# Patient Record
Sex: FEMALE | Race: WHITE | NOT HISPANIC OR LATINO
[De-identification: names, ages, dates, MRNs, and addresses within clinical notes are randomized per-mention and may not be internally consistent; named-entity substitution may affect disease eponyms.]

---

## 2017-02-02 ENCOUNTER — APPOINTMENT (OUTPATIENT)
Dept: ORTHOPEDIC SURGERY | Facility: CLINIC | Age: 62
End: 2017-02-02

## 2017-02-02 ENCOUNTER — OTHER (OUTPATIENT)
Age: 62
End: 2017-02-02

## 2017-02-02 DIAGNOSIS — Z85.9 PERSONAL HISTORY OF MALIGNANT NEOPLASM, UNSPECIFIED: ICD-10-CM

## 2017-02-02 DIAGNOSIS — Z82.62 FAMILY HISTORY OF OSTEOPOROSIS: ICD-10-CM

## 2017-02-02 DIAGNOSIS — Z78.9 OTHER SPECIFIED HEALTH STATUS: ICD-10-CM

## 2017-03-02 ENCOUNTER — APPOINTMENT (OUTPATIENT)
Dept: ORTHOPEDIC SURGERY | Facility: CLINIC | Age: 62
End: 2017-03-02

## 2017-03-02 DIAGNOSIS — S92.412A DISPLACED FRACTURE OF PROXIMAL PHALANX OF LEFT GREAT TOE, INITIAL ENCOUNTER FOR CLOSED FRACTURE: ICD-10-CM

## 2017-04-06 ENCOUNTER — APPOINTMENT (OUTPATIENT)
Dept: ORTHOPEDIC SURGERY | Facility: CLINIC | Age: 62
End: 2017-04-06

## 2017-05-12 ENCOUNTER — RX RENEWAL (OUTPATIENT)
Age: 62
End: 2017-05-12

## 2017-06-06 ENCOUNTER — RX RENEWAL (OUTPATIENT)
Age: 62
End: 2017-06-06

## 2017-07-10 ENCOUNTER — APPOINTMENT (OUTPATIENT)
Dept: INTERNAL MEDICINE | Facility: CLINIC | Age: 62
End: 2017-07-10

## 2017-07-10 VITALS
HEIGHT: 62 IN | SYSTOLIC BLOOD PRESSURE: 172 MMHG | DIASTOLIC BLOOD PRESSURE: 82 MMHG | BODY MASS INDEX: 26.87 KG/M2 | WEIGHT: 146 LBS | TEMPERATURE: 98.1 F | HEART RATE: 108 BPM | OXYGEN SATURATION: 98 %

## 2017-07-10 DIAGNOSIS — L98.9 DISORDER OF THE SKIN AND SUBCUTANEOUS TISSUE, UNSPECIFIED: ICD-10-CM

## 2017-07-10 DIAGNOSIS — Z82.49 FAMILY HISTORY OF ISCHEMIC HEART DISEASE AND OTHER DISEASES OF THE CIRCULATORY SYSTEM: ICD-10-CM

## 2017-07-10 DIAGNOSIS — Z83.3 FAMILY HISTORY OF DIABETES MELLITUS: ICD-10-CM

## 2017-07-10 DIAGNOSIS — Z80.1 FAMILY HISTORY OF MALIGNANT NEOPLASM OF TRACHEA, BRONCHUS AND LUNG: ICD-10-CM

## 2017-07-10 RX ORDER — AMOXICILLIN 875 MG/1
875 TABLET, FILM COATED ORAL
Qty: 19 | Refills: 0 | Status: DISCONTINUED | COMMUNITY
Start: 2017-01-27 | End: 2017-07-10

## 2017-07-10 RX ORDER — AMOXICILLIN 500 MG/1
500 CAPSULE ORAL
Qty: 22 | Refills: 0 | Status: DISCONTINUED | COMMUNITY
Start: 2016-12-21 | End: 2017-07-10

## 2017-07-10 RX ORDER — PREDNISONE 20 MG/1
20 TABLET ORAL
Qty: 10 | Refills: 0 | Status: DISCONTINUED | COMMUNITY
Start: 2016-12-10 | End: 2017-07-10

## 2017-07-10 RX ORDER — BIMATOPROST 0.1 MG/ML
0.01 SOLUTION/ DROPS OPHTHALMIC
Qty: 5 | Refills: 0 | Status: DISCONTINUED | COMMUNITY
Start: 2016-08-12 | End: 2017-07-10

## 2017-07-10 RX ORDER — BETAMETHASONE VALERATE 1 MG/G
0.1 CREAM TOPICAL
Qty: 45 | Refills: 0 | Status: DISCONTINUED | COMMUNITY
Start: 2016-12-20 | End: 2017-07-10

## 2017-07-10 RX ORDER — HYDROCODONE BITARTRATE AND ACETAMINOPHEN 5; 300 MG/1; MG/1
5-300 TABLET ORAL
Qty: 60 | Refills: 0 | Status: DISCONTINUED | COMMUNITY
Start: 2016-12-14 | End: 2017-07-10

## 2017-07-24 ENCOUNTER — APPOINTMENT (OUTPATIENT)
Dept: DERMATOLOGY | Facility: CLINIC | Age: 62
End: 2017-07-24

## 2017-08-17 ENCOUNTER — OUTPATIENT (OUTPATIENT)
Dept: OUTPATIENT SERVICES | Facility: HOSPITAL | Age: 62
LOS: 1 days | Discharge: HOME | End: 2017-08-17

## 2017-08-17 DIAGNOSIS — Z85.3 PERSONAL HISTORY OF MALIGNANT NEOPLASM OF BREAST: ICD-10-CM

## 2017-08-17 DIAGNOSIS — Z90.10 ACQUIRED ABSENCE OF UNSPECIFIED BREAST AND NIPPLE: Chronic | ICD-10-CM

## 2017-08-21 ENCOUNTER — APPOINTMENT (OUTPATIENT)
Dept: HEMATOLOGY ONCOLOGY | Facility: CLINIC | Age: 62
End: 2017-08-21

## 2017-09-25 ENCOUNTER — APPOINTMENT (OUTPATIENT)
Dept: OBGYN | Facility: CLINIC | Age: 62
End: 2017-09-25
Payer: MEDICAID

## 2017-09-25 VITALS
HEIGHT: 62 IN | BODY MASS INDEX: 27.23 KG/M2 | WEIGHT: 148 LBS | DIASTOLIC BLOOD PRESSURE: 80 MMHG | SYSTOLIC BLOOD PRESSURE: 140 MMHG

## 2017-09-25 DIAGNOSIS — Z92.21 PERSONAL HISTORY OF ANTINEOPLASTIC CHEMOTHERAPY: ICD-10-CM

## 2017-09-25 PROCEDURE — 82270 OCCULT BLOOD FECES: CPT

## 2017-09-25 PROCEDURE — 99386 PREV VISIT NEW AGE 40-64: CPT

## 2017-09-27 LAB — HPV HIGH+LOW RISK DNA PNL CVX: NEGATIVE

## 2017-09-28 LAB — CYTOLOGY CVX/VAG DOC THIN PREP: NORMAL

## 2017-10-13 ENCOUNTER — RX RENEWAL (OUTPATIENT)
Age: 62
End: 2017-10-13

## 2017-11-30 ENCOUNTER — MEDICATION RENEWAL (OUTPATIENT)
Age: 62
End: 2017-11-30

## 2017-12-03 ENCOUNTER — RX RENEWAL (OUTPATIENT)
Age: 62
End: 2017-12-03

## 2018-02-28 ENCOUNTER — RX RENEWAL (OUTPATIENT)
Age: 63
End: 2018-02-28

## 2018-05-29 ENCOUNTER — RX RENEWAL (OUTPATIENT)
Age: 63
End: 2018-05-29

## 2018-06-05 ENCOUNTER — RX RENEWAL (OUTPATIENT)
Age: 63
End: 2018-06-05

## 2018-07-23 ENCOUNTER — RX RENEWAL (OUTPATIENT)
Age: 63
End: 2018-07-23

## 2018-08-06 ENCOUNTER — RX RENEWAL (OUTPATIENT)
Age: 63
End: 2018-08-06

## 2018-09-04 ENCOUNTER — RX RENEWAL (OUTPATIENT)
Age: 63
End: 2018-09-04

## 2018-09-17 ENCOUNTER — APPOINTMENT (OUTPATIENT)
Dept: OBGYN | Facility: CLINIC | Age: 63
End: 2018-09-17

## 2018-09-19 ENCOUNTER — APPOINTMENT (OUTPATIENT)
Dept: INTERNAL MEDICINE | Facility: CLINIC | Age: 63
End: 2018-09-19
Payer: MEDICAID

## 2018-09-19 VITALS — HEIGHT: 62 IN | WEIGHT: 151 LBS | BODY MASS INDEX: 27.79 KG/M2 | HEART RATE: 99 BPM | OXYGEN SATURATION: 97 %

## 2018-09-19 VITALS — SYSTOLIC BLOOD PRESSURE: 148 MMHG | DIASTOLIC BLOOD PRESSURE: 90 MMHG

## 2018-09-19 VITALS — SYSTOLIC BLOOD PRESSURE: 160 MMHG | DIASTOLIC BLOOD PRESSURE: 98 MMHG

## 2018-09-19 DIAGNOSIS — S92.412D DISPLACED FRACTURE OF PROXIMAL PHALANX OF LEFT GREAT TOE, SUBSEQUENT ENCOUNTER FOR FRACTURE WITH ROUTINE HEALING: ICD-10-CM

## 2018-09-19 DIAGNOSIS — Z85.3 PERSONAL HISTORY OF MALIGNANT NEOPLASM OF BREAST: ICD-10-CM

## 2018-09-19 DIAGNOSIS — Z01.419 ENCOUNTER FOR GYNECOLOGICAL EXAMINATION (GENERAL) (ROUTINE) W/OUT ABNORMAL FINDINGS: ICD-10-CM

## 2018-09-19 PROCEDURE — 99214 OFFICE O/P EST MOD 30 MIN: CPT | Mod: 25

## 2018-09-19 PROCEDURE — 36415 COLL VENOUS BLD VENIPUNCTURE: CPT

## 2018-09-19 NOTE — HISTORY OF PRESENT ILLNESS
[de-identified] : Patient presents for follow up of hypertension on metoprolol, breast cancer diagnosed in 2009 s/p L mastectomy (and prophylactic left mastectomy) now on letrozole and osteoporosis not on medication (previously on Prolia). She checks her BP at home with readings 140/80s. She complains of anxiety, worse when not working. Has had some changes at home as he  recently started a new job and her health insurance will be changing.

## 2018-09-19 NOTE — PHYSICAL EXAM
[No Acute Distress] : no acute distress [No Respiratory Distress] : no respiratory distress  [Clear to Auscultation] : lungs were clear to auscultation bilaterally [No Accessory Muscle Use] : no accessory muscle use [Normal Rate] : normal rate  [Regular Rhythm] : with a regular rhythm [Normal S1, S2] : normal S1 and S2 [No Murmur] : no murmur heard [No Edema] : there was no peripheral edema [Normal Affect] : the affect was normal [Alert and Oriented x3] : oriented to person, place, and time [Normal Insight/Judgement] : insight and judgment were intact

## 2018-09-19 NOTE — ASSESSMENT
[FreeTextEntry1] : Blood pressure elevated. Does not want to add another medication. Will consider changing meds. Will change to benazepril 40mg for improved BP control, will first taper off metorpolol. She will continue to check BP at home with goal readings <130/80.  \par On calcium/vitamin D for osteoporosis.\par xanax rpn anxiety.\par She follows with oncology, regularly for history of breast cancer. \par She is current with gyn. \par Declined colon cancer screening.  \par She will follow up in 6-8 weeks.

## 2018-10-15 ENCOUNTER — RX RENEWAL (OUTPATIENT)
Age: 63
End: 2018-10-15

## 2019-01-09 ENCOUNTER — RX RENEWAL (OUTPATIENT)
Age: 64
End: 2019-01-09

## 2019-01-19 ENCOUNTER — RX RENEWAL (OUTPATIENT)
Age: 64
End: 2019-01-19

## 2019-01-30 ENCOUNTER — MEDICATION RENEWAL (OUTPATIENT)
Age: 64
End: 2019-01-30

## 2019-02-04 ENCOUNTER — MEDICATION RENEWAL (OUTPATIENT)
Age: 64
End: 2019-02-04

## 2019-02-08 ENCOUNTER — RX RENEWAL (OUTPATIENT)
Age: 64
End: 2019-02-08

## 2019-04-19 ENCOUNTER — RX RENEWAL (OUTPATIENT)
Age: 64
End: 2019-04-19

## 2019-06-25 ENCOUNTER — RX RENEWAL (OUTPATIENT)
Age: 64
End: 2019-06-25

## 2019-11-12 ENCOUNTER — MEDICATION RENEWAL (OUTPATIENT)
Age: 64
End: 2019-11-12

## 2019-11-13 ENCOUNTER — MEDICATION RENEWAL (OUTPATIENT)
Age: 64
End: 2019-11-13

## 2019-12-02 ENCOUNTER — RX RENEWAL (OUTPATIENT)
Age: 64
End: 2019-12-02

## 2019-12-03 ENCOUNTER — RX RENEWAL (OUTPATIENT)
Age: 64
End: 2019-12-03

## 2020-01-16 ENCOUNTER — APPOINTMENT (OUTPATIENT)
Dept: INTERNAL MEDICINE | Facility: CLINIC | Age: 65
End: 2020-01-16
Payer: MEDICARE

## 2020-01-16 ENCOUNTER — NON-APPOINTMENT (OUTPATIENT)
Age: 65
End: 2020-01-16

## 2020-01-16 VITALS
WEIGHT: 144 LBS | BODY MASS INDEX: 26.5 KG/M2 | SYSTOLIC BLOOD PRESSURE: 150 MMHG | OXYGEN SATURATION: 97 % | HEART RATE: 95 BPM | DIASTOLIC BLOOD PRESSURE: 80 MMHG | HEIGHT: 62 IN

## 2020-01-16 PROCEDURE — G0442 ANNUAL ALCOHOL SCREEN 15 MIN: CPT | Mod: 59

## 2020-01-16 PROCEDURE — G0402 INITIAL PREVENTIVE EXAM: CPT

## 2020-01-16 PROCEDURE — 36415 COLL VENOUS BLD VENIPUNCTURE: CPT

## 2020-01-16 PROCEDURE — 93000 ELECTROCARDIOGRAM COMPLETE: CPT | Mod: 59

## 2020-01-16 RX ORDER — DENOSUMAB 60 MG/ML
INJECTION SUBCUTANEOUS
Refills: 0 | Status: DISCONTINUED | COMMUNITY
End: 2020-01-16

## 2020-01-16 RX ORDER — METOPROLOL SUCCINATE 50 MG/1
50 TABLET, EXTENDED RELEASE ORAL DAILY
Qty: 30 | Refills: 5 | Status: DISCONTINUED | COMMUNITY
Start: 2016-11-16 | End: 2020-01-16

## 2020-01-16 RX ORDER — IBUPROFEN 200 MG
200 TABLET ORAL
Qty: 180 | Refills: 0 | Status: DISCONTINUED | COMMUNITY
Start: 2018-04-20 | End: 2020-01-16

## 2020-01-16 RX ORDER — LETROZOLE TABLETS 2.5 MG/1
2.5 TABLET, FILM COATED ORAL DAILY
Qty: 90 | Refills: 2 | Status: DISCONTINUED | COMMUNITY
Start: 2017-05-12 | End: 2020-01-16

## 2020-01-17 ENCOUNTER — RESULT REVIEW (OUTPATIENT)
Age: 65
End: 2020-01-17

## 2020-01-17 LAB
ALBUMIN SERPL ELPH-MCNC: 4.3 G/DL
ALP BLD-CCNC: 75 U/L
ALT SERPL-CCNC: 7 U/L
ANION GAP SERPL CALC-SCNC: 15 MMOL/L
AST SERPL-CCNC: 17 U/L
BASOPHILS # BLD AUTO: 0.08 K/UL
BASOPHILS NFR BLD AUTO: 1 %
BILIRUB SERPL-MCNC: 0.8 MG/DL
BUN SERPL-MCNC: 19 MG/DL
CALCIUM SERPL-MCNC: 10.2 MG/DL
CHLORIDE SERPL-SCNC: 105 MMOL/L
CHOLEST SERPL-MCNC: 195 MG/DL
CHOLEST/HDLC SERPL: 4.1 RATIO
CO2 SERPL-SCNC: 23 MMOL/L
CREAT SERPL-MCNC: 0.66 MG/DL
CREAT SPEC-SCNC: 143 MG/DL
EOSINOPHIL # BLD AUTO: 0.13 K/UL
EOSINOPHIL NFR BLD AUTO: 1.6 %
ESTIMATED AVERAGE GLUCOSE: 120 MG/DL
GLUCOSE SERPL-MCNC: 102 MG/DL
HBA1C MFR BLD HPLC: 5.8 %
HCT VFR BLD CALC: 43.3 %
HDLC SERPL-MCNC: 48 MG/DL
HGB BLD-MCNC: 13.7 G/DL
IMM GRANULOCYTES NFR BLD AUTO: 0.1 %
LDLC SERPL CALC-MCNC: 122 MG/DL
LYMPHOCYTES # BLD AUTO: 1.71 K/UL
LYMPHOCYTES NFR BLD AUTO: 20.8 %
MAN DIFF?: NORMAL
MCHC RBC-ENTMCNC: 27.1 PG
MCHC RBC-ENTMCNC: 31.6 GM/DL
MCV RBC AUTO: 85.6 FL
MICROALBUMIN 24H UR DL<=1MG/L-MCNC: 3.2 MG/DL
MICROALBUMIN/CREAT 24H UR-RTO: 23 MG/G
MONOCYTES # BLD AUTO: 0.59 K/UL
MONOCYTES NFR BLD AUTO: 7.2 %
NEUTROPHILS # BLD AUTO: 5.71 K/UL
NEUTROPHILS NFR BLD AUTO: 69.3 %
PLATELET # BLD AUTO: 402 K/UL
POTASSIUM SERPL-SCNC: 4.4 MMOL/L
PROT SERPL-MCNC: 7.3 G/DL
RBC # BLD: 5.06 M/UL
RBC # FLD: 13.8 %
SODIUM SERPL-SCNC: 142 MMOL/L
TRIGL SERPL-MCNC: 130 MG/DL
TSH SERPL-ACNC: 0.71 UIU/ML
WBC # FLD AUTO: 8.23 K/UL

## 2020-01-17 NOTE — ASSESSMENT
[FreeTextEntry1] : -Patient declines repeat DEXA\par  -Patient declines f/u w/ GI at this time for colonoscopy (has never had a colonoscopy)\par  -F/u w/ oncology regarding Hx/o breast CA\par  -F/u w/ GYN for Pap smear\par  -BP well-controlled. Continue current medication\par -LAD noted on EKG which is new. Cardiology consultation/evaluation (referral given today)\par  -Start Lexapro 5 mg for management of anxiety. She is to call or RTO in 3-4 weeks to discuss response to treatment.\par  -RTO 2 months (readdress anxiety, PCV13, Shingrix, Colonoscopy)

## 2020-01-17 NOTE — HEALTH RISK ASSESSMENT
[Very Good] : ~his/her~  mood as very good [No] : In the past 12 months have you used drugs other than those required for medical reasons? No [No falls in past year] : Patient reported no falls in the past year [0] : 2) Feeling down, depressed, or hopeless: Not at all (0) [Patient declined bone density test] : Patient declined bone density test [Patient reported PAP Smear was normal] : Patient reported PAP Smear was normal [Patient declined colonoscopy] : Patient declined colonoscopy [None] : None [With Family] : lives with family [Employed] : employed [] :  [# Of Children ___] : has [unfilled] children [Sexually Active] : sexually active [Reviewed no changes] : Reviewed no changes [Fully functional (bathing, dressing, toileting, transferring, walking, feeding)] : Fully functional (bathing, dressing, toileting, transferring, walking, feeding) [Fully functional (using the telephone, shopping, preparing meals, housekeeping, doing laundry, using] : Fully functional and needs no help or supervision to perform IADLs (using the telephone, shopping, preparing meals, housekeeping, doing laundry, using transportation, managing medications and managing finances) [] : No [Audit-CScore] : 0 [[id number]] : 0 [Change in mental status noted] : No change in mental status noted [High Risk Behavior] : no high risk behavior [Reports changes in hearing] : Reports no changes in hearing [Reports changes in vision] : Reports no changes in vision [MammogramComments] : B/l Mastectomy [PapSmearDate] : 01/18 [BoneDensityDate] : 01/20 [ColonoscopyDate] : 01/20 [AdvancecareDate] : 01/20

## 2020-01-17 NOTE — HISTORY OF PRESENT ILLNESS
[FreeTextEntry1] : Annual well check  [de-identified] : -PMH: Breast CA (Dx 2009. S/p B/l  Mastectomy & Chemo), HTN, Osteoporosis\par -SH: . Day care center teacher. 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 \par Today, pt reports feeling well but reports a significant amount of anxiety that has been going on for several years since Dx of breast CA. Her anxiety is situational with regards to doctor visit. She enjoys working. Her husbands health and their financial concerns are also stress inducing. Was taking Xanax in the past. She inquirer about other medication at this time. \par \par -HTN: Currently on Benzapril 40mg QD. \par -Osteoporosis: Was on Prolia in the past.\par \par Follows with the following Physicians: \par -Pain Mgt: Dr. Caro\par -OBGYN: Dr. Collado\par \par -Vaccines: Needs PPSV 23, PCV 13, Shingles, TDap\par -DEXA: 2018\par -Pap Smear: 2018\par -Colonoscopy: Never had one. Declines at this time.

## 2020-01-17 NOTE — ADDENDUM
[FreeTextEntry1] : TSH WNL\par CBC/CMP WNL\par Lipid panel WNL\par Urine microalbumin WNL\par A1c 5.8\par \par Patient does have prediabetes and thus recommend dietary/lifestyle modifications with goal of weight loss and we'll repeat labs in 6 months

## 2020-01-30 ENCOUNTER — RX RENEWAL (OUTPATIENT)
Age: 65
End: 2020-01-30

## 2020-02-10 ENCOUNTER — RX CHANGE (OUTPATIENT)
Age: 65
End: 2020-02-10

## 2020-02-11 ENCOUNTER — RX CHANGE (OUTPATIENT)
Age: 65
End: 2020-02-11

## 2020-02-26 ENCOUNTER — RX CHANGE (OUTPATIENT)
Age: 65
End: 2020-02-26

## 2020-02-27 ENCOUNTER — RX CHANGE (OUTPATIENT)
Age: 65
End: 2020-02-27

## 2020-08-28 ENCOUNTER — RX RENEWAL (OUTPATIENT)
Age: 65
End: 2020-08-28

## 2020-11-18 ENCOUNTER — APPOINTMENT (OUTPATIENT)
Dept: INTERNAL MEDICINE | Facility: CLINIC | Age: 65
End: 2020-11-18
Payer: MEDICARE

## 2020-11-18 VITALS
TEMPERATURE: 97 F | HEIGHT: 62 IN | SYSTOLIC BLOOD PRESSURE: 166 MMHG | OXYGEN SATURATION: 98 % | BODY MASS INDEX: 26.31 KG/M2 | HEART RATE: 103 BPM | RESPIRATION RATE: 14 BRPM | WEIGHT: 143 LBS | DIASTOLIC BLOOD PRESSURE: 98 MMHG

## 2020-11-18 PROCEDURE — 36415 COLL VENOUS BLD VENIPUNCTURE: CPT

## 2020-11-18 PROCEDURE — 99214 OFFICE O/P EST MOD 30 MIN: CPT | Mod: 25

## 2020-11-18 RX ORDER — LATANOPROST/PF 0.005 %
DROPS OPHTHALMIC (EYE)
Refills: 0 | Status: ACTIVE | COMMUNITY

## 2020-11-18 RX ORDER — IBUPROFEN 200 MG/1
200 TABLET, COATED ORAL EVERY 8 HOURS
Qty: 180 | Refills: 2 | Status: DISCONTINUED | COMMUNITY
Start: 2017-07-10 | End: 2020-11-18

## 2020-11-18 RX ORDER — IBUPROFEN 200 MG/1
200 TABLET, FILM COATED ORAL
Qty: 180 | Refills: 2 | Status: DISCONTINUED | COMMUNITY
Start: 2017-10-13 | End: 2020-11-18

## 2020-11-18 RX ORDER — ALPRAZOLAM 0.25 MG/1
0.25 TABLET ORAL
Qty: 30 | Refills: 0 | Status: DISCONTINUED | COMMUNITY
Start: 2018-09-19 | End: 2020-11-18

## 2020-11-18 RX ORDER — LETROZOLE TABLETS 2.5 MG/1
2.5 TABLET, FILM COATED ORAL DAILY
Qty: 90 | Refills: 0 | Status: DISCONTINUED | COMMUNITY
Start: 2017-06-06 | End: 2020-11-18

## 2020-11-18 RX ORDER — TIMOLOL MALEATE 5 MG/ML
0.5 SOLUTION OPHTHALMIC
Refills: 0 | Status: ACTIVE | COMMUNITY
Start: 2016-10-12

## 2020-11-19 ENCOUNTER — NON-APPOINTMENT (OUTPATIENT)
Age: 65
End: 2020-11-19

## 2020-11-19 LAB
ANION GAP SERPL CALC-SCNC: 11 MMOL/L
BASOPHILS # BLD AUTO: 0.08 K/UL
BASOPHILS NFR BLD AUTO: 0.8 %
BUN SERPL-MCNC: 21 MG/DL
CALCIUM SERPL-MCNC: 10 MG/DL
CHLORIDE SERPL-SCNC: 104 MMOL/L
CHOLEST SERPL-MCNC: 210 MG/DL
CO2 SERPL-SCNC: 24 MMOL/L
CREAT SERPL-MCNC: 0.63 MG/DL
EOSINOPHIL # BLD AUTO: 0.33 K/UL
EOSINOPHIL NFR BLD AUTO: 3.4 %
ESTIMATED AVERAGE GLUCOSE: 114 MG/DL
GLUCOSE SERPL-MCNC: 101 MG/DL
HBA1C MFR BLD HPLC: 5.6 %
HCT VFR BLD CALC: 42.4 %
HDLC SERPL-MCNC: 49 MG/DL
HGB BLD-MCNC: 13.4 G/DL
IMM GRANULOCYTES NFR BLD AUTO: 0.2 %
LDLC SERPL CALC-MCNC: 132 MG/DL
LYMPHOCYTES # BLD AUTO: 4.05 K/UL
LYMPHOCYTES NFR BLD AUTO: 41.2 %
MAN DIFF?: NORMAL
MCHC RBC-ENTMCNC: 26.6 PG
MCHC RBC-ENTMCNC: 31.6 GM/DL
MCV RBC AUTO: 84.3 FL
MONOCYTES # BLD AUTO: 0.8 K/UL
MONOCYTES NFR BLD AUTO: 8.1 %
NEUTROPHILS # BLD AUTO: 4.56 K/UL
NEUTROPHILS NFR BLD AUTO: 46.3 %
NONHDLC SERPL-MCNC: 162 MG/DL
PLATELET # BLD AUTO: 383 K/UL
POTASSIUM SERPL-SCNC: 4.1 MMOL/L
RBC # BLD: 5.03 M/UL
RBC # FLD: 14.5 %
SODIUM SERPL-SCNC: 139 MMOL/L
TRIGL SERPL-MCNC: 148 MG/DL
WBC # FLD AUTO: 9.84 K/UL

## 2020-11-19 NOTE — HISTORY OF PRESENT ILLNESS
[FreeTextEntry1] : F/u HTN [de-identified] : -PMH: HTN, Breast CA (Dx 2009. 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 She mentions that she checks her BP at home and on Avg her SBP is 130-140. No reported changes. \par \par -ANDREAS: She was started on Lexapro back in January but states she never started medication and then failed to f/u. She does report persistence of anxiety. And constant worrying regarding her health and the health of her close family members. She is most concerned there is something wrong or that she will develop bad news. She denies feeling down or depressed.\par \par -Osteoporosis: Was on Prolia in the past but no longer. Now on Boniva as of September 2020 as per Ortho. Remains on Vit-D & Ca sup.

## 2020-11-19 NOTE — ASSESSMENT
[FreeTextEntry1] : -PMH: HTN, Breast CA (Dx 2009. 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 Caro\par -OBGYN: Dr. Collado\par \par -F/u w/ oncology regarding Hx/o breast CA\par -F/u w/ GYN (Pap smear)\par -Still needs to f/u w/ Cardio (New LAD noted on EKG)\par -RTO 1mo via Telehealth

## 2020-11-21 ENCOUNTER — TRANSCRIPTION ENCOUNTER (OUTPATIENT)
Age: 65
End: 2020-11-21

## 2020-12-16 ENCOUNTER — APPOINTMENT (OUTPATIENT)
Dept: INTERNAL MEDICINE | Facility: CLINIC | Age: 65
End: 2020-12-16
Payer: MEDICARE

## 2020-12-16 PROCEDURE — 99441: CPT

## 2020-12-16 RX ORDER — ESCITALOPRAM OXALATE 5 MG/1
5 TABLET ORAL DAILY
Qty: 30 | Refills: 0 | Status: DISCONTINUED | COMMUNITY
Start: 2020-01-16 | End: 2020-12-16

## 2020-12-16 NOTE — HISTORY OF PRESENT ILLNESS
[FreeTextEntry1] : F/u HTN [Home] : at home, [unfilled] , at the time of the visit. [Medical Office: ([facility] [place])___] : at the medical office located in  [Verbal consent obtained from patient] : the patient, [unfilled] [de-identified] : -PMH: HTN, Breast CA (Dx 2009. 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 Today, she reports that her anxiety remains unchanged as previously discussed at last followup visit. She inquires about alternative therapy\par \par \par -HTN: Remains on Benzapril 40mg QD. She mentions that she checks her BP at home and on Avg her SBP is 130-140. No reported changes. \par \par -ANDREAS: She was started on Lexapro back in January but states she never started medication and then failed to f/u. She does report persistence of anxiety. And constant worrying regarding her health and the health of her close family members. She is most concerned there is something wrong or that she will develop bad news. She denies feeling down or depressed.\par \par -Osteoporosis: Was on Prolia in the past but no longer. Now on Boniva as of September 2020 as per Ortho. Remains on Vit-D & Ca sup.

## 2020-12-16 NOTE — ASSESSMENT
[FreeTextEntry1] : -PMH: HTN, Breast CA (Dx 2009. S/p B/l Mastectomy & Chemo), Osteoporosis\par -SH: . Day care center teacher. 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 Caro\par -OBGYN: Dr. Collado\par \par -F/u w/ oncology regarding Hx/o breast CA\par -F/u w/ GYN (Pap smear)\par -Still needs to f/u w/ Cardio (New LAD noted on EKG)\par -RTO 2wk via Telehealth

## 2021-01-11 ENCOUNTER — APPOINTMENT (OUTPATIENT)
Dept: INTERNAL MEDICINE | Facility: CLINIC | Age: 66
End: 2021-01-11
Payer: MEDICARE

## 2021-01-11 PROCEDURE — 99441: CPT

## 2021-01-11 NOTE — HISTORY OF PRESENT ILLNESS
[Home] : at home, [unfilled] , at the time of the visit. [Medical Office: ([facility])___] : at the medical office located in  [Verbal consent obtained from patient] : the patient, [unfilled] [FreeTextEntry1] : Anxiety  [de-identified] : -PMH: HTN, Breast CA (Dx 2009. 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 She does not note any significant improvement of symptoms at this time but also no adverse effects. \par \par -ANDREAS: Unable to tolerate Lexapro. Now on Sertraline as of December 16th. Previously, pt reported constant worrying regarding her health and the health of her close family members. She is most concerned there is something wrong or that she will develop bad news. She continues to deny feeling down or depressed. \par \par -HTN: Remains on Benzapril 40mg QD. She mentions that she checks her BP at home and on Avg her SBP is 130-140. No reported changes\par \par -Osteoporosis: Was on Prolia in the past but no longer. Now on Boniva as of September 2020 as per Ortho. Remains on Vit-D & Ca sup.

## 2021-02-03 ENCOUNTER — RX RENEWAL (OUTPATIENT)
Age: 66
End: 2021-02-03

## 2021-03-22 ENCOUNTER — APPOINTMENT (OUTPATIENT)
Dept: INTERNAL MEDICINE | Facility: CLINIC | Age: 66
End: 2021-03-22
Payer: MEDICARE

## 2021-03-22 ENCOUNTER — TRANSCRIPTION ENCOUNTER (OUTPATIENT)
Age: 66
End: 2021-03-22

## 2021-03-22 VITALS
DIASTOLIC BLOOD PRESSURE: 88 MMHG | RESPIRATION RATE: 13 BRPM | SYSTOLIC BLOOD PRESSURE: 145 MMHG | HEART RATE: 89 BPM | TEMPERATURE: 98 F

## 2021-03-22 DIAGNOSIS — Z97.3 PRESENCE OF SPECTACLES AND CONTACT LENSES: ICD-10-CM

## 2021-03-22 DIAGNOSIS — H53.9 UNSPECIFIED VISUAL DISTURBANCE: ICD-10-CM

## 2021-03-22 DIAGNOSIS — H40.9 UNSPECIFIED GLAUCOMA: ICD-10-CM

## 2021-03-22 DIAGNOSIS — H26.9 UNSPECIFIED CATARACT: ICD-10-CM

## 2021-03-22 PROCEDURE — 99072 ADDL SUPL MATRL&STAF TM PHE: CPT

## 2021-03-22 PROCEDURE — 99213 OFFICE O/P EST LOW 20 MIN: CPT | Mod: 25

## 2021-03-22 PROCEDURE — 36415 COLL VENOUS BLD VENIPUNCTURE: CPT

## 2021-03-22 RX ORDER — SERTRALINE HYDROCHLORIDE 50 MG/1
50 TABLET, FILM COATED ORAL
Qty: 60 | Refills: 0 | Status: DISCONTINUED | COMMUNITY
Start: 2020-12-16 | End: 2021-03-22

## 2021-03-22 RX ORDER — LETROZOLE TABLETS 2.5 MG/1
2.5 TABLET, FILM COATED ORAL DAILY
Qty: 90 | Refills: 3 | Status: DISCONTINUED | COMMUNITY
Start: 2019-02-08 | End: 2021-03-22

## 2021-03-22 NOTE — HISTORY OF PRESENT ILLNESS
[FreeTextEntry8] : Dr. WILLARD pt\par -Pt presents c/o Vision issues that started a few weeks ago. Patient states she does follow with ophthalmology q.6 months and she does wear glasses. Patient was told she does have the start of cataracts. Patient does continue on lansoprazole/timolol for glaucoma and reports pressure as good. Patient has had no vision loss/eye discharge/eye pain but patient states" blurry at times". Patient has underlying anxiety and states she is worried about her eyes and is concerned she is diabetic, would like blood work checked. Last A1c was normal at 5.6

## 2021-03-22 NOTE — ASSESSMENT
[FreeTextEntry1] : Venipuncture done in our office, Labs sent out.Ophthalmology followup as scheduled. Patient will return here for routine check as scheduled w/Dr. WILLARD\par \par Dr. Luke was present in office building while I examined patient\par \par EYE MD-Dr. Morley\par

## 2021-03-22 NOTE — PHYSICAL EXAM
[No Acute Distress] : no acute distress [No Respiratory Distress] : no respiratory distress  [Clear to Auscultation] : lungs were clear to auscultation bilaterally [Normal Rate] : normal rate  [Regular Rhythm] : with a regular rhythm [Normal Affect] : the affect was normal [Normal Mood] : the mood was normal [Normal Sclera/Conjunctiva] : normal sclera/conjunctiva [PERRL] : pupils equal round and reactive to light [EOMI] : extraocular movements intact [Normal Outer Ear/Nose] : the outer ears and nose were normal in appearance [Normal Oropharynx] : the oropharynx was normal [Normal TMs] : both tympanic membranes were normal [Normal Nasal Mucosa] : the nasal mucosa was normal [Supple] : supple

## 2021-03-23 LAB
ALBUMIN SERPL ELPH-MCNC: 4.5 G/DL
ALP BLD-CCNC: 83 U/L
ALT SERPL-CCNC: 14 U/L
ANION GAP SERPL CALC-SCNC: 12 MMOL/L
AST SERPL-CCNC: 20 U/L
BASOPHILS # BLD AUTO: 0.11 K/UL
BASOPHILS NFR BLD AUTO: 1.1 %
BILIRUB SERPL-MCNC: 0.3 MG/DL
BUN SERPL-MCNC: 18 MG/DL
CALCIUM SERPL-MCNC: 10 MG/DL
CHLORIDE SERPL-SCNC: 101 MMOL/L
CO2 SERPL-SCNC: 25 MMOL/L
CREAT SERPL-MCNC: 0.73 MG/DL
EOSINOPHIL # BLD AUTO: 0.25 K/UL
EOSINOPHIL NFR BLD AUTO: 2.6 %
ESTIMATED AVERAGE GLUCOSE: 120 MG/DL
GLUCOSE SERPL-MCNC: 95 MG/DL
HBA1C MFR BLD HPLC: 5.8 %
HCT VFR BLD CALC: 41.4 %
HGB BLD-MCNC: 12.9 G/DL
IMM GRANULOCYTES NFR BLD AUTO: 0.2 %
LYMPHOCYTES # BLD AUTO: 3.27 K/UL
LYMPHOCYTES NFR BLD AUTO: 33.8 %
MAN DIFF?: NORMAL
MCHC RBC-ENTMCNC: 26.5 PG
MCHC RBC-ENTMCNC: 31.2 GM/DL
MCV RBC AUTO: 85.2 FL
MONOCYTES # BLD AUTO: 0.77 K/UL
MONOCYTES NFR BLD AUTO: 8 %
NEUTROPHILS # BLD AUTO: 5.25 K/UL
NEUTROPHILS NFR BLD AUTO: 54.3 %
PLATELET # BLD AUTO: 374 K/UL
POTASSIUM SERPL-SCNC: 3.8 MMOL/L
PROT SERPL-MCNC: 7.4 G/DL
RBC # BLD: 4.86 M/UL
RBC # FLD: 14.1 %
SODIUM SERPL-SCNC: 139 MMOL/L
TSH SERPL-ACNC: 1.07 UIU/ML
WBC # FLD AUTO: 9.67 K/UL

## 2021-03-30 ENCOUNTER — APPOINTMENT (OUTPATIENT)
Dept: INTERNAL MEDICINE | Facility: CLINIC | Age: 66
End: 2021-03-30
Payer: MEDICARE

## 2021-03-30 ENCOUNTER — LABORATORY RESULT (OUTPATIENT)
Age: 66
End: 2021-03-30

## 2021-03-30 VITALS
RESPIRATION RATE: 14 BRPM | HEART RATE: 117 BPM | BODY MASS INDEX: 26.87 KG/M2 | OXYGEN SATURATION: 98 % | SYSTOLIC BLOOD PRESSURE: 160 MMHG | HEIGHT: 62 IN | WEIGHT: 146 LBS | TEMPERATURE: 97.1 F | DIASTOLIC BLOOD PRESSURE: 90 MMHG

## 2021-03-30 LAB
BILIRUB UR QL STRIP: NEGATIVE
CLARITY UR: CLEAR
COLLECTION METHOD: NORMAL
GLUCOSE UR-MCNC: NEGATIVE
HCG UR QL: 0.2 EU/DL
HGB UR QL STRIP.AUTO: NORMAL
KETONES UR-MCNC: NEGATIVE
LEUKOCYTE ESTERASE UR QL STRIP: NEGATIVE
NITRITE UR QL STRIP: NEGATIVE
PH UR STRIP: 6.5
PROT UR STRIP-MCNC: NEGATIVE
SP GR UR STRIP: 1.01

## 2021-03-30 PROCEDURE — 81002 URINALYSIS NONAUTO W/O SCOPE: CPT

## 2021-03-30 PROCEDURE — 99072 ADDL SUPL MATRL&STAF TM PHE: CPT

## 2021-03-30 PROCEDURE — 99213 OFFICE O/P EST LOW 20 MIN: CPT | Mod: 25

## 2021-03-31 LAB
APPEARANCE: CLEAR
BILIRUBIN URINE: NEGATIVE
BLOOD URINE: ABNORMAL
COLOR: COLORLESS
GLUCOSE QUALITATIVE U: NEGATIVE
KETONES URINE: NEGATIVE
LEUKOCYTE ESTERASE URINE: NEGATIVE
NITRITE URINE: NEGATIVE
PH URINE: 6.5
PROTEIN URINE: NEGATIVE
SPECIFIC GRAVITY URINE: 1
UROBILINOGEN URINE: NORMAL

## 2021-04-01 NOTE — HISTORY OF PRESENT ILLNESS
[FreeTextEntry8] : -PMH: HTN, Breast CA (Dx 2009. 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

## 2021-04-01 NOTE — ASSESSMENT
[FreeTextEntry1] : Some reported history common urine dipstick obtained in office and physical exam, findings seem most consistent with a uncomplicated UTI. Will place patient on Macrobid 100 mg p.o. b.i.d. x5 days. Will follow up urine culture and urinalysis. Further recommendations pending results and response to therapy. Should symptoms fail to improve or should she develop signs of fever she is to return to office or go to the ER immediately.

## 2021-04-01 NOTE — ADDENDUM
[FreeTextEntry1] : Patient's urine culture came back negative for a urinary tract infection. I would like for patient to complete her antibiotic regimen. I would also like for her to schedule an appointment with a urologist for further evaluation of hematuria. If symptoms return while awaiting uro consultat, pt to RTO

## 2021-04-12 ENCOUNTER — RX RENEWAL (OUTPATIENT)
Age: 66
End: 2021-04-12

## 2021-06-18 ENCOUNTER — RX RENEWAL (OUTPATIENT)
Age: 66
End: 2021-06-18

## 2021-07-21 ENCOUNTER — APPOINTMENT (OUTPATIENT)
Dept: INTERNAL MEDICINE | Facility: CLINIC | Age: 66
End: 2021-07-21

## 2021-08-27 ENCOUNTER — RX RENEWAL (OUTPATIENT)
Age: 66
End: 2021-08-27

## 2021-09-30 ENCOUNTER — TRANSCRIPTION ENCOUNTER (OUTPATIENT)
Age: 66
End: 2021-09-30

## 2021-12-10 ENCOUNTER — TRANSCRIPTION ENCOUNTER (OUTPATIENT)
Age: 66
End: 2021-12-10

## 2022-02-11 ENCOUNTER — NON-APPOINTMENT (OUTPATIENT)
Age: 67
End: 2022-02-11

## 2022-03-02 ENCOUNTER — APPOINTMENT (OUTPATIENT)
Dept: INTERNAL MEDICINE | Facility: CLINIC | Age: 67
End: 2022-03-02
Payer: MEDICARE

## 2022-03-02 VITALS
TEMPERATURE: 97.2 F | BODY MASS INDEX: 28.2 KG/M2 | WEIGHT: 153.25 LBS | HEIGHT: 62 IN | SYSTOLIC BLOOD PRESSURE: 164 MMHG | OXYGEN SATURATION: 99 % | DIASTOLIC BLOOD PRESSURE: 96 MMHG | HEART RATE: 130 BPM | RESPIRATION RATE: 16 BRPM

## 2022-03-02 DIAGNOSIS — Z00.00 ENCOUNTER FOR GENERAL ADULT MEDICAL EXAMINATION W/OUT ABNORMAL FINDINGS: ICD-10-CM

## 2022-03-02 DIAGNOSIS — R39.9 UNSPECIFIED SYMPTOMS AND SIGNS INVOLVING THE GENITOURINARY SYSTEM: ICD-10-CM

## 2022-03-02 PROCEDURE — 99214 OFFICE O/P EST MOD 30 MIN: CPT

## 2022-03-02 RX ORDER — NITROFURANTOIN (MONOHYDRATE/MACROCRYSTALS) 25; 75 MG/1; MG/1
100 CAPSULE ORAL
Qty: 10 | Refills: 0 | Status: DISCONTINUED | COMMUNITY
Start: 2021-03-30 | End: 2022-03-02

## 2022-03-02 NOTE — HISTORY OF PRESENT ILLNESS
[FreeTextEntry8] : -PMH: HTN, Breast CA (Dx 2009. 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

## 2022-04-05 ENCOUNTER — APPOINTMENT (OUTPATIENT)
Dept: INTERNAL MEDICINE | Facility: CLINIC | Age: 67
End: 2022-04-05

## 2022-04-07 ENCOUNTER — APPOINTMENT (OUTPATIENT)
Dept: INTERNAL MEDICINE | Facility: CLINIC | Age: 67
End: 2022-04-07
Payer: MEDICARE

## 2022-04-07 VITALS
RESPIRATION RATE: 16 BRPM | TEMPERATURE: 97.2 F | HEIGHT: 62 IN | HEART RATE: 92 BPM | SYSTOLIC BLOOD PRESSURE: 148 MMHG | WEIGHT: 151 LBS | DIASTOLIC BLOOD PRESSURE: 98 MMHG | OXYGEN SATURATION: 98 % | BODY MASS INDEX: 27.79 KG/M2

## 2022-04-07 PROCEDURE — 99214 OFFICE O/P EST MOD 30 MIN: CPT

## 2022-04-07 NOTE — ASSESSMENT
[FreeTextEntry1] : -PMH: HTN, ANDREAS, Breast CA (Dx 2009. S/p B/l Mastectomy & Chemo)\par -SH: . Day care center teacher. 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

## 2022-04-07 NOTE — HISTORY OF PRESENT ILLNESS
[FreeTextEntry1] :  HTN & ANDREAS [de-identified] : -PMH: HTN, ANDREAS, Breast CA (Dx 2009. S/p B/l Mastectomy & Chemo)\par -SH: . Day care center teacher. 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 Remains on Benzapril 40mg QD. She brings with her, home BP monitoring log (Avg -130)\par -ANDREAS: Was started on Fluoxetine 10mg QD but DCd due to concern given her h/o Glaucoma \par -Osteoporosis: Was on Prolia in the past but no longer. Now on Boniva as of September 2020 as per Ortho. Remains on Vit-D & Ca sup.

## 2022-04-15 ENCOUNTER — RX RENEWAL (OUTPATIENT)
Age: 67
End: 2022-04-15

## 2022-06-24 ENCOUNTER — APPOINTMENT (OUTPATIENT)
Dept: INTERNAL MEDICINE | Facility: CLINIC | Age: 67
End: 2022-06-24

## 2022-08-09 ENCOUNTER — APPOINTMENT (OUTPATIENT)
Dept: INTERNAL MEDICINE | Facility: CLINIC | Age: 67
End: 2022-08-09

## 2022-08-09 VITALS — DIASTOLIC BLOOD PRESSURE: 83 MMHG | SYSTOLIC BLOOD PRESSURE: 127 MMHG

## 2022-08-09 VITALS
HEART RATE: 92 BPM | SYSTOLIC BLOOD PRESSURE: 170 MMHG | RESPIRATION RATE: 15 BRPM | HEIGHT: 62 IN | BODY MASS INDEX: 28.16 KG/M2 | DIASTOLIC BLOOD PRESSURE: 100 MMHG | WEIGHT: 153 LBS | OXYGEN SATURATION: 96 % | TEMPERATURE: 97.4 F

## 2022-08-09 DIAGNOSIS — Z23 ENCOUNTER FOR IMMUNIZATION: ICD-10-CM

## 2022-08-09 PROCEDURE — 90471 IMMUNIZATION ADMIN: CPT

## 2022-08-09 PROCEDURE — 36415 COLL VENOUS BLD VENIPUNCTURE: CPT

## 2022-08-09 PROCEDURE — 90715 TDAP VACCINE 7 YRS/> IM: CPT

## 2022-08-09 PROCEDURE — 99214 OFFICE O/P EST MOD 30 MIN: CPT | Mod: 25

## 2022-08-10 LAB
25(OH)D3 SERPL-MCNC: 33.7 NG/ML
ALBUMIN SERPL ELPH-MCNC: 4.3 G/DL
ALP BLD-CCNC: 79 U/L
ALT SERPL-CCNC: 13 U/L
ANION GAP SERPL CALC-SCNC: 11 MMOL/L
AST SERPL-CCNC: 18 U/L
BASOPHILS # BLD AUTO: 0.06 K/UL
BASOPHILS NFR BLD AUTO: 0.8 %
BILIRUB SERPL-MCNC: 0.7 MG/DL
BUN SERPL-MCNC: 15 MG/DL
CALCIUM SERPL-MCNC: 9.9 MG/DL
CHLORIDE SERPL-SCNC: 106 MMOL/L
CHOLEST SERPL-MCNC: 228 MG/DL
CO2 SERPL-SCNC: 24 MMOL/L
CREAT SERPL-MCNC: 0.6 MG/DL
EGFR: 98 ML/MIN/1.73M2
EOSINOPHIL # BLD AUTO: 0.18 K/UL
EOSINOPHIL NFR BLD AUTO: 2.3 %
ESTIMATED AVERAGE GLUCOSE: 123 MG/DL
GLUCOSE SERPL-MCNC: 101 MG/DL
HBA1C MFR BLD HPLC: 5.9 %
HCT VFR BLD CALC: 44.3 %
HDLC SERPL-MCNC: 48 MG/DL
HGB BLD-MCNC: 13.9 G/DL
IMM GRANULOCYTES NFR BLD AUTO: 0.3 %
LDLC SERPL CALC-MCNC: 146 MG/DL
LYMPHOCYTES # BLD AUTO: 2.31 K/UL
LYMPHOCYTES NFR BLD AUTO: 28.9 %
MAN DIFF?: NORMAL
MCHC RBC-ENTMCNC: 27.4 PG
MCHC RBC-ENTMCNC: 31.4 GM/DL
MCV RBC AUTO: 87.4 FL
MONOCYTES # BLD AUTO: 0.57 K/UL
MONOCYTES NFR BLD AUTO: 7.1 %
NEUTROPHILS # BLD AUTO: 4.86 K/UL
NEUTROPHILS NFR BLD AUTO: 60.6 %
NONHDLC SERPL-MCNC: 180 MG/DL
PLATELET # BLD AUTO: 401 K/UL
POTASSIUM SERPL-SCNC: 4.6 MMOL/L
PROT SERPL-MCNC: 7.1 G/DL
RBC # BLD: 5.07 M/UL
RBC # FLD: 15 %
SODIUM SERPL-SCNC: 141 MMOL/L
TRIGL SERPL-MCNC: 171 MG/DL
WBC # FLD AUTO: 8 K/UL

## 2022-08-10 NOTE — HISTORY OF PRESENT ILLNESS
[FreeTextEntry1] :  HTN & ANDREAS [de-identified] : -PMH: HTN, ANDREAS, Breast CA (Dx 2009. 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 \par \par COREY is a 67 year F whom is here today for f/u HTN & ANDREAS\par Today, pt reports feeling well\par \par -HTN: Remains on Metoprolol 25mg QD & Benzapril 40mg QD. Bring Home BP Log with her. Avg SBP\par \par -ANDREAS: Never started Prozac\par \par -Osteoporosis: Was on Prolia in the past but no longer. (9/2020) Boniva Started per Ortho. Remains on Vit-D & Ca sup. \par -H/o Breast CA: Dx 2009. S/p B/l Mastectomy & Chemo. Was on Letrozole

## 2022-08-10 NOTE — HEALTH RISK ASSESSMENT
[0] : 2) Feeling down, depressed, or hopeless: Not at all (0) [PHQ-2 Negative - No further assessment needed] : PHQ-2 Negative - No further assessment needed [[id number]] : 0

## 2022-08-10 NOTE — ADDENDUM
[FreeTextEntry1] : Persistent pre-DM. DIet, exercise & weight loss encouraged. WIll continue to monitor routinely\par \par Elevated cholesterol. Recommendations as above. In addition, given her h/o HTN & age, i recommend she start a low dose cholesterol medication (Lipitor 10mg)

## 2022-08-10 NOTE — ASSESSMENT
[FreeTextEntry1] : -PMH: HTN, ANDREAS, Breast CA (Dx 2009. 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 \par \par COREY is a 67 year F whom is here today for f/u HTN & ANDREAS\par \par Specialists: \par -GYN:  ?\par -GI:  ?\par -Heme/Onc:  ?\par \par -F/u labs drawn in office today\par -Further recs pending lab results\par -RTO 6mo for CPE or sooner if needed

## 2022-08-23 ENCOUNTER — RX RENEWAL (OUTPATIENT)
Age: 67
End: 2022-08-23

## 2022-08-26 ENCOUNTER — APPOINTMENT (OUTPATIENT)
Dept: INTERNAL MEDICINE | Facility: CLINIC | Age: 67
End: 2022-08-26

## 2022-09-16 ENCOUNTER — RX RENEWAL (OUTPATIENT)
Age: 67
End: 2022-09-16

## 2022-11-01 ENCOUNTER — NON-APPOINTMENT (OUTPATIENT)
Age: 67
End: 2022-11-01

## 2022-11-02 ENCOUNTER — NON-APPOINTMENT (OUTPATIENT)
Age: 67
End: 2022-11-02

## 2023-03-30 ENCOUNTER — APPOINTMENT (OUTPATIENT)
Dept: INTERNAL MEDICINE | Facility: CLINIC | Age: 68
End: 2023-03-30

## 2023-04-17 ENCOUNTER — RX RENEWAL (OUTPATIENT)
Age: 68
End: 2023-04-17

## 2023-04-19 ENCOUNTER — RX RENEWAL (OUTPATIENT)
Age: 68
End: 2023-04-19

## 2023-05-04 ENCOUNTER — RX RENEWAL (OUTPATIENT)
Age: 68
End: 2023-05-04

## 2023-08-03 ENCOUNTER — APPOINTMENT (OUTPATIENT)
Dept: INTERNAL MEDICINE | Facility: CLINIC | Age: 68
End: 2023-08-03
Payer: MEDICARE

## 2023-08-03 VITALS
WEIGHT: 154 LBS | BODY MASS INDEX: 28.34 KG/M2 | RESPIRATION RATE: 15 BRPM | OXYGEN SATURATION: 98 % | HEIGHT: 62 IN | HEART RATE: 77 BPM | DIASTOLIC BLOOD PRESSURE: 90 MMHG | TEMPERATURE: 97.1 F | SYSTOLIC BLOOD PRESSURE: 140 MMHG

## 2023-08-03 DIAGNOSIS — F41.9 ANXIETY DISORDER, UNSPECIFIED: ICD-10-CM

## 2023-08-03 DIAGNOSIS — E78.5 HYPERLIPIDEMIA, UNSPECIFIED: ICD-10-CM

## 2023-08-03 DIAGNOSIS — M81.0 AGE-RELATED OSTEOPOROSIS W/OUT CURRENT PATHOLOGICAL FRACTURE: ICD-10-CM

## 2023-08-03 DIAGNOSIS — I10 ESSENTIAL (PRIMARY) HYPERTENSION: ICD-10-CM

## 2023-08-03 DIAGNOSIS — R73.03 PREDIABETES.: ICD-10-CM

## 2023-08-03 PROCEDURE — 99214 OFFICE O/P EST MOD 30 MIN: CPT | Mod: 25

## 2023-08-03 PROCEDURE — 36415 COLL VENOUS BLD VENIPUNCTURE: CPT

## 2023-08-03 RX ORDER — IBANDRONATE SODIUM 150 MG/1
150 TABLET, FILM COATED ORAL
Qty: 1 | Refills: 2 | Status: DISCONTINUED | COMMUNITY
Start: 2020-11-18 | End: 2023-08-03

## 2023-08-03 NOTE — PHYSICAL EXAM
[Normal] : no carotid or abdominal bruits heard, no varicosities, pedal pulses are present, no peripheral edema, no extremity clubbing or cyanosis and no palpable aorta
no

## 2023-08-04 LAB
25(OH)D3 SERPL-MCNC: 24.9 NG/ML
ALBUMIN SERPL ELPH-MCNC: 4.5 G/DL
ALP BLD-CCNC: 79 U/L
ALT SERPL-CCNC: 14 U/L
ANION GAP SERPL CALC-SCNC: 11 MMOL/L
AST SERPL-CCNC: 18 U/L
BILIRUB SERPL-MCNC: 0.8 MG/DL
BUN SERPL-MCNC: 19 MG/DL
CALCIUM SERPL-MCNC: 10.2 MG/DL
CHLORIDE SERPL-SCNC: 103 MMOL/L
CHOLEST SERPL-MCNC: 241 MG/DL
CO2 SERPL-SCNC: 24 MMOL/L
CREAT SERPL-MCNC: 0.71 MG/DL
EGFR: 92 ML/MIN/1.73M2
ESTIMATED AVERAGE GLUCOSE: 123 MG/DL
GLUCOSE SERPL-MCNC: 97 MG/DL
HBA1C MFR BLD HPLC: 5.9 %
HDLC SERPL-MCNC: 53 MG/DL
LDLC SERPL CALC-MCNC: 162 MG/DL
NONHDLC SERPL-MCNC: 187 MG/DL
POTASSIUM SERPL-SCNC: 5.3 MMOL/L
PROT SERPL-MCNC: 7.3 G/DL
SODIUM SERPL-SCNC: 138 MMOL/L
TRIGL SERPL-MCNC: 140 MG/DL

## 2023-08-04 NOTE — HISTORY OF PRESENT ILLNESS
[FreeTextEntry1] : HTN & ANDREAS & osteoporosis [de-identified] : -PMH: HTN, ANDREAS, Breast CA (Dx 2009. 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   COREY is a 68 year F whom is here today for f/u HTN & ANDREAS & osteoporosis Today, pt reports feeling well  -HTN: Remains on Metoprolol 25mg QD & Benzapril 40mg QD. Bring Home BP Log with her. Avg SBP  -ANDREAS: slight imoprovement w/ 10mg prozac  -Osteoporosis: Was on Prolia in the past but no longer. (9/2020) Boniva Started per Ortho which she is now off of. Remains on Vit-D & Ca sup.  -H/o Breast CA: Dx 2009. S/p B/l Mastectomy & Chemo. Was on Letrozole

## 2023-08-04 NOTE — HEALTH RISK ASSESSMENT
[0] : 2) Feeling down, depressed, or hopeless: Not at all (0) [PHQ-2 Negative - No further assessment needed] : PHQ-2 Negative - No further assessment needed [[id number]] : 0

## 2023-08-10 ENCOUNTER — NON-APPOINTMENT (OUTPATIENT)
Age: 68
End: 2023-08-10

## 2023-08-24 ENCOUNTER — APPOINTMENT (OUTPATIENT)
Dept: PLASTIC SURGERY | Facility: CLINIC | Age: 68
End: 2023-08-24
Payer: MEDICARE

## 2023-08-24 VITALS
OXYGEN SATURATION: 98 % | SYSTOLIC BLOOD PRESSURE: 173 MMHG | DIASTOLIC BLOOD PRESSURE: 100 MMHG | HEIGHT: 61.5 IN | WEIGHT: 154 LBS | BODY MASS INDEX: 28.7 KG/M2 | HEART RATE: 73 BPM

## 2023-08-24 DIAGNOSIS — Z85.3 PERSONAL HISTORY OF MALIGNANT NEOPLASM OF BREAST: ICD-10-CM

## 2023-08-24 PROCEDURE — 99204 OFFICE O/P NEW MOD 45 MIN: CPT

## 2023-08-24 NOTE — HISTORY OF PRESENT ILLNESS
[FreeTextEntry1] : Ms. COREY LARA  is a 68 year yo female  who has a history of breast cancer and bilateral breast augmentation, who has developed bilateral breast pain associated with periprosthetic scarring. She  complains of bilateral breast pain associated with her  breast implants. She  is unable to sleep on her  stomach due to severe pain associated with the breast implants.  She  is also unable to do activities of daily life like running, biking, raising her arms above her head or out to the side, and lifting.  She states that she had a mastectomy done in 2009/2010 for her breast cancer, and underwent breast augmentation in 2010  and this brought her up to a 34 C cup.     She  states that she  has never felt comfortable with implants and is worried about the health risks.  She is concerned about the risk of anaplastic large cell lymphoma.   She  has worried about the health implications of the implants, and she  has had some systemic issues which she  is unsure are related to the implants.  Specifically has complaints of joint pain, hair loss, chronic fatigue, vision changes, and poor memory. She  has been recommended to have the implants removed.        She  does not wish to have any more implants placed and I would not recommend doing that given the health concerns, and concerns for ALCL, and the painful bilateral capsular contractures

## 2023-08-24 NOTE — ASSESSMENT
[FreeTextEntry1] : 68 year yo female with a history of bilateral saline implants, bilateral painful, visible, capsular contractures, (Baker IV) and a concern for anaplastic large cell lymphoma (ALCL) and systemic issues stemming from implants.   I feel that it is a reasonable plan to go to the operating room to remove both breast implants, and perform bilateral periprosthetic capsulectomies. In addition to addressing the above concerns, this may make breast cancer surveillance easier in the future.   I advised that, given the extent of the injury to skin from the implants, she  will need a reconstructive mastopexy given breast shape, significant ptosis, skin quality, and will plan to do a mastopexy at the time of explantation.     Patient expresses that she will think about left saline breast implant replacement and get back to us for further surgical planning  Risks, benefits, and alternatives were discussed including the risks of infection, bleeding, seroma, hematoma, asymmetry, nipple necrosis, change in sensitivity of nipples, change in breast skin sensation, fat necrosis, oil cysts, poor scarring, keloid formation, hypertrophic scarring, dehiscence, and delayed wound healing.  The patient understands these risks, all questions were answered and she wishes to proceed with bilateral breast implant removal, bilateral periprosthetic capsulectomy, bilateral reconstructive mastopexy to restore volume lost to atrophy directly related to the implants.  Informed consent was obtained.

## 2023-09-05 ENCOUNTER — RX RENEWAL (OUTPATIENT)
Age: 68
End: 2023-09-05

## 2023-10-17 PROBLEM — Z85.3 HISTORY OF MALIGNANT NEOPLASM OF BREAST: Status: RESOLVED | Noted: 2017-02-02 | Resolved: 2023-10-17

## 2023-10-17 PROBLEM — Z85.3 PERSONAL HISTORY OF BREAST CANCER: Status: ACTIVE | Noted: 2023-10-17

## 2023-12-28 ENCOUNTER — RX RENEWAL (OUTPATIENT)
Age: 68
End: 2023-12-28

## 2023-12-28 RX ORDER — FLUOXETINE HYDROCHLORIDE 20 MG/1
20 CAPSULE ORAL
Qty: 90 | Refills: 0 | Status: ACTIVE | COMMUNITY
Start: 2022-03-02 | End: 1900-01-01

## 2024-02-20 ENCOUNTER — APPOINTMENT (OUTPATIENT)
Dept: INTERNAL MEDICINE | Facility: CLINIC | Age: 69
End: 2024-02-20

## 2024-03-08 ENCOUNTER — RX RENEWAL (OUTPATIENT)
Age: 69
End: 2024-03-08

## 2024-03-11 RX ORDER — BENAZEPRIL HYDROCHLORIDE 40 MG/1
40 TABLET, FILM COATED ORAL
Qty: 30 | Refills: 0 | Status: ACTIVE | COMMUNITY
Start: 2018-09-19 | End: 1900-01-01

## 2024-03-12 RX ORDER — METOPROLOL SUCCINATE 25 MG/1
25 TABLET, EXTENDED RELEASE ORAL
Qty: 30 | Refills: 0 | Status: ACTIVE | COMMUNITY
Start: 2022-03-02 | End: 1900-01-01

## 2024-04-03 ENCOUNTER — RX RENEWAL (OUTPATIENT)
Age: 69
End: 2024-04-03

## 2024-04-12 ENCOUNTER — RX RENEWAL (OUTPATIENT)
Age: 69
End: 2024-04-12

## 2024-07-08 ENCOUNTER — RX RENEWAL (OUTPATIENT)
Age: 69
End: 2024-07-08

## 2024-08-04 PROBLEM — U07.1 COVID-19 VIRUS INFECTION: Status: ACTIVE | Noted: 2024-08-04

## 2024-09-24 ENCOUNTER — APPOINTMENT (OUTPATIENT)
Dept: INTERNAL MEDICINE | Facility: CLINIC | Age: 69
End: 2024-09-24
Payer: MEDICARE

## 2024-09-24 VITALS — SYSTOLIC BLOOD PRESSURE: 120 MMHG | DIASTOLIC BLOOD PRESSURE: 75 MMHG

## 2024-09-24 VITALS
TEMPERATURE: 97.2 F | BODY MASS INDEX: 29.45 KG/M2 | SYSTOLIC BLOOD PRESSURE: 138 MMHG | WEIGHT: 158 LBS | DIASTOLIC BLOOD PRESSURE: 90 MMHG | RESPIRATION RATE: 15 BRPM | HEIGHT: 61.5 IN | OXYGEN SATURATION: 98 % | HEART RATE: 87 BPM

## 2024-09-24 DIAGNOSIS — U07.1 COVID-19: ICD-10-CM

## 2024-09-24 DIAGNOSIS — R73.03 PREDIABETES.: ICD-10-CM

## 2024-09-24 DIAGNOSIS — F41.9 ANXIETY DISORDER, UNSPECIFIED: ICD-10-CM

## 2024-09-24 DIAGNOSIS — I10 ESSENTIAL (PRIMARY) HYPERTENSION: ICD-10-CM

## 2024-09-24 DIAGNOSIS — E66.3 OVERWEIGHT: ICD-10-CM

## 2024-09-24 DIAGNOSIS — M81.0 AGE-RELATED OSTEOPOROSIS W/OUT CURRENT PATHOLOGICAL FRACTURE: ICD-10-CM

## 2024-09-24 DIAGNOSIS — E78.5 HYPERLIPIDEMIA, UNSPECIFIED: ICD-10-CM

## 2024-09-24 PROCEDURE — 99214 OFFICE O/P EST MOD 30 MIN: CPT

## 2024-09-24 PROCEDURE — G2211 COMPLEX E/M VISIT ADD ON: CPT

## 2024-09-24 PROCEDURE — 36415 COLL VENOUS BLD VENIPUNCTURE: CPT

## 2024-09-24 RX ORDER — ESCITALOPRAM OXALATE 5 MG/1
5 TABLET ORAL
Qty: 90 | Refills: 0 | Status: ACTIVE | COMMUNITY
Start: 2024-09-24 | End: 1900-01-01

## 2024-09-25 LAB
25(OH)D3 SERPL-MCNC: 35.1 NG/ML
ALBUMIN SERPL ELPH-MCNC: 4.3 G/DL
ALP BLD-CCNC: 81 U/L
ALT SERPL-CCNC: 12 U/L
ANION GAP SERPL CALC-SCNC: 14 MMOL/L
AST SERPL-CCNC: 19 U/L
BASOPHILS # BLD AUTO: 0.09 K/UL
BASOPHILS NFR BLD AUTO: 1.4 %
BILIRUB SERPL-MCNC: 0.6 MG/DL
BUN SERPL-MCNC: 16 MG/DL
CALCIUM SERPL-MCNC: 9.6 MG/DL
CALCIUM SERPL-MCNC: 9.6 MG/DL
CHLORIDE SERPL-SCNC: 103 MMOL/L
CHOLEST SERPL-MCNC: 218 MG/DL
CO2 SERPL-SCNC: 24 MMOL/L
CREAT SERPL-MCNC: 0.73 MG/DL
EGFR: 88 ML/MIN/1.73M2
EOSINOPHIL # BLD AUTO: 0.26 K/UL
EOSINOPHIL NFR BLD AUTO: 3.9 %
ESTIMATED AVERAGE GLUCOSE: 117 MG/DL
GLUCOSE SERPL-MCNC: 95 MG/DL
HBA1C MFR BLD HPLC: 5.7 %
HCT VFR BLD CALC: 43.3 %
HDLC SERPL-MCNC: 46 MG/DL
HGB BLD-MCNC: 13.5 G/DL
IMM GRANULOCYTES NFR BLD AUTO: 0.5 %
LDLC SERPL CALC-MCNC: 149 MG/DL
LYMPHOCYTES # BLD AUTO: 1.86 K/UL
LYMPHOCYTES NFR BLD AUTO: 27.9 %
MAN DIFF?: NORMAL
MCHC RBC-ENTMCNC: 26.6 PG
MCHC RBC-ENTMCNC: 31.2 GM/DL
MCV RBC AUTO: 85.4 FL
MONOCYTES # BLD AUTO: 0.52 K/UL
MONOCYTES NFR BLD AUTO: 7.8 %
NEUTROPHILS # BLD AUTO: 3.9 K/UL
NEUTROPHILS NFR BLD AUTO: 58.5 %
NONHDLC SERPL-MCNC: 171 MG/DL
PARATHYROID HORMONE INTACT: 59 PG/ML
PLATELET # BLD AUTO: 399 K/UL
POTASSIUM SERPL-SCNC: 4.9 MMOL/L
PROT SERPL-MCNC: 7.2 G/DL
RBC # BLD: 5.07 M/UL
RBC # FLD: 15.3 %
SODIUM SERPL-SCNC: 140 MMOL/L
TRIGL SERPL-MCNC: 125 MG/DL
TSH SERPL-ACNC: 1.24 UIU/ML
WBC # FLD AUTO: 6.66 K/UL

## 2024-09-25 NOTE — HEALTH RISK ASSESSMENT
[0] : 2) Feeling down, depressed, or hopeless: Not at all (0) [PHQ-2 Negative - No further assessment needed] : PHQ-2 Negative - No further assessment needed [[id number]] : 0

## 2024-09-25 NOTE — ADDENDUM
[FreeTextEntry1] : Labs all good aside the following:  Uncontrolled total and LDL cholesterol.  Aside from dietary modifications with reduced consumption of red meat and dairy along with weight loss recommend starting Lipitor 10 mg RTO 3 months  A1c 5.7 down from 5.9 Will continue to monitor routinely

## 2024-09-25 NOTE — HISTORY OF PRESENT ILLNESS
[FreeTextEntry1] : HTN & ANDREAS & osteoporosis, HLD [de-identified] : -PMH: HTN, ANDREAS, Breast CA (Dx 2009. 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   COREY is a 69 year F whom is here today for f/u HTN, HLD & ANDREAS & osteoporosis Today, pt reports feeling well  -HTN: Remains on Metoprolol 25mg QD & Benzapril 40mg QD  -ANDREAS: No longer on Fluoxetine 20mg QD  -Osteoporosis: Previously on Prolia. (9/2020) Boniva Started per Ortho which she is now off of. Remains on Vit-D & Ca sup.  -H/o Breast CA: Dx 2009. S/p B/l Mastectomy & Chemo. Was on Letrozole

## 2024-09-25 NOTE — ASSESSMENT
Unfortunately Dr. Anton's schedule is double booked and unable to accommodate a new patient appointment within a week. Next opening where patient can be double booked is 11/14/18 at 10:30 am at [facility].     Spoke with Iesha and provided next availability. She said that her  is a physician and he spoke directly with Dr. Anton. She said that her  provided Dr. Anton with CD's, etc and Dr. Anton told her  to call clinic and get an appointment. Iesha states that she is calling as her  told her to call to set up appointment. I told Iesha if Dr. Anton spoke with her spouse I am unaware of that and will have to send a message to Dr. Anton to see where he would like to add patient to the schedule. I also stated patient may need a referral, depending on her insurance. She said patient has Medicare and [employer] as patient's spouse is a retired [employer] physician. I told her I am not familiar with the insurance aspect whether they require a referral or not. She asked that I talk with Dr. Anton regarding an appointment.     Dr. Anton, please advise.   [FreeTextEntry1] : -PMH: HTN, ANDREAS, Breast CA (Dx 2009. 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   Specialists:  -GYN:  ? -GI:  ? -Heme/Onc:  ?  -F/u labs drawn in office today -Further recs pending lab results -RTO 6mo for CPE or sooner if needed

## 2024-09-25 NOTE — HEALTH RISK ASSESSMENT
[0] : 2) Feeling down, depressed, or hopeless: Not at all (0) [PHQ-2 Negative - No further assessment needed] : PHQ-2 Negative - No further assessment needed [[id number]] : 0

## 2024-09-25 NOTE — ASSESSMENT
[FreeTextEntry1] : -PMH: HTN, ANDREAS, Breast CA (Dx 2009. 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   Specialists:  -GYN:  ? -GI:  ? -Heme/Onc:  ?  -F/u labs drawn in office today -Further recs pending lab results -RTO 6mo for CPE or sooner if needed

## 2024-09-25 NOTE — HISTORY OF PRESENT ILLNESS
[FreeTextEntry1] : HTN & ANDREAS & osteoporosis, HLD [de-identified] : -PMH: HTN, ANDREAS, Breast CA (Dx 2009. 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   COREY is a 69 year F whom is here today for f/u HTN, HLD & ANDREAS & osteoporosis Today, pt reports feeling well  -HTN: Remains on Metoprolol 25mg QD & Benzapril 40mg QD  -ANDREAS: No longer on Fluoxetine 20mg QD  -Osteoporosis: Previously on Prolia. (9/2020) Boniva Started per Ortho which she is now off of. Remains on Vit-D & Ca sup.  -H/o Breast CA: Dx 2009. S/p B/l Mastectomy & Chemo. Was on Letrozole

## 2024-09-30 ENCOUNTER — RX RENEWAL (OUTPATIENT)
Age: 69
End: 2024-09-30

## 2024-11-01 ENCOUNTER — RX RENEWAL (OUTPATIENT)
Age: 69
End: 2024-11-01

## 2025-02-19 ENCOUNTER — NON-APPOINTMENT (OUTPATIENT)
Age: 70
End: 2025-02-19

## 2025-04-11 ENCOUNTER — RX RENEWAL (OUTPATIENT)
Age: 70
End: 2025-04-11